# Patient Record
Sex: FEMALE | Race: WHITE | Employment: FULL TIME | URBAN - METROPOLITAN AREA
[De-identification: names, ages, dates, MRNs, and addresses within clinical notes are randomized per-mention and may not be internally consistent; named-entity substitution may affect disease eponyms.]

---

## 2024-03-31 SDOH — ECONOMIC STABILITY: HOUSING INSECURITY
IN THE LAST 12 MONTHS, WAS THERE A TIME WHEN YOU DID NOT HAVE A STEADY PLACE TO SLEEP OR SLEPT IN A SHELTER (INCLUDING NOW)?: NO

## 2024-03-31 SDOH — ECONOMIC STABILITY: FOOD INSECURITY: WITHIN THE PAST 12 MONTHS, YOU WORRIED THAT YOUR FOOD WOULD RUN OUT BEFORE YOU GOT MONEY TO BUY MORE.: NEVER TRUE

## 2024-03-31 SDOH — ECONOMIC STABILITY: INCOME INSECURITY: HOW HARD IS IT FOR YOU TO PAY FOR THE VERY BASICS LIKE FOOD, HOUSING, MEDICAL CARE, AND HEATING?: NOT VERY HARD

## 2024-03-31 SDOH — ECONOMIC STABILITY: FOOD INSECURITY: WITHIN THE PAST 12 MONTHS, THE FOOD YOU BOUGHT JUST DIDN'T LAST AND YOU DIDN'T HAVE MONEY TO GET MORE.: NEVER TRUE

## 2024-03-31 SDOH — ECONOMIC STABILITY: TRANSPORTATION INSECURITY
IN THE PAST 12 MONTHS, HAS LACK OF TRANSPORTATION KEPT YOU FROM MEETINGS, WORK, OR FROM GETTING THINGS NEEDED FOR DAILY LIVING?: NO

## 2024-04-03 ENCOUNTER — OFFICE VISIT (OUTPATIENT)
Dept: OBGYN CLINIC | Age: 34
End: 2024-04-03
Payer: COMMERCIAL

## 2024-04-03 VITALS
BODY MASS INDEX: 25.27 KG/M2 | DIASTOLIC BLOOD PRESSURE: 74 MMHG | HEIGHT: 67 IN | WEIGHT: 161 LBS | SYSTOLIC BLOOD PRESSURE: 112 MMHG

## 2024-04-03 DIAGNOSIS — Z11.51 SCREENING FOR HPV (HUMAN PAPILLOMAVIRUS): ICD-10-CM

## 2024-04-03 DIAGNOSIS — Z12.4 PAP SMEAR FOR CERVICAL CANCER SCREENING: ICD-10-CM

## 2024-04-03 DIAGNOSIS — E66.3 OVERWEIGHT: ICD-10-CM

## 2024-04-03 DIAGNOSIS — N97.9 INFERTILITY, FEMALE: ICD-10-CM

## 2024-04-03 DIAGNOSIS — Z01.419 WELL WOMAN EXAM WITH ROUTINE GYNECOLOGICAL EXAM: Primary | ICD-10-CM

## 2024-04-03 DIAGNOSIS — Z31.69 INFERTILITY COUNSELING: ICD-10-CM

## 2024-04-03 PROCEDURE — 99385 PREV VISIT NEW AGE 18-39: CPT | Performed by: OBSTETRICS & GYNECOLOGY

## 2024-04-03 PROCEDURE — 99213 OFFICE O/P EST LOW 20 MIN: CPT | Performed by: OBSTETRICS & GYNECOLOGY

## 2024-04-03 PROCEDURE — 99459 PELVIC EXAMINATION: CPT | Performed by: OBSTETRICS & GYNECOLOGY

## 2024-04-03 RX ORDER — PNV NO.95/FERROUS FUM/FOLIC AC 28MG-0.8MG
TABLET ORAL
COMMUNITY

## 2024-04-03 NOTE — PROGRESS NOTES
CC:  Annual GYN exam    HPI:  33 y.o.  No obstetric history on file. presents today for a routine gynecological examination.  Patient's last menstrual period was 2024..    Pt additionally wants to discuss c/o concern for infertility in addition to her wellness exam. Her and her  have been trying x 6 months.   He is 41 and had negative semen analysis with [facility]  + OPKs at home with regular cycles  No endometriosis or PCOS symptoms  She has not seen a PCP recently. No recent lab work  + Fatigue   Timing intercourse each month   Healthy with no medical issues. On no medications   Non-smoker    Contraception:  None. Currently trying to conceive. On a PNV      Menses:  Regular    Sexually active:yes  No changes in sexual partners --declines STD testing        Ob hx:  G0      GYN HISTORY:  As per HPI     Last Pap:  5 years ago, normal   Hx of Abnl Paps: remote hx    Hx STDs: No       OB History          0    Para   0    Term   0       0    AB   0    Living   0         SAB   0    IAB   0    Ectopic   0    Molar   0    Multiple   0    Live Births   0                  Past Medical History:   Diagnosis Date    Abnormal Pap smear of cervix          Past Surgical History:   Procedure Laterality Date    COLPOSCOPY           Outpatient Encounter Medications as of 4/3/2024   Medication Sig Dispense Refill    Prenatal Vit-Fe Fumarate-FA (PRENATAL VITAMIN) 27-0.8 MG TABS Take by mouth       No facility-administered encounter medications on file as of 4/3/2024.         No Known Allergies      Family History   Problem Relation Age of Onset    Breast Cancer Paternal Grandmother     Breast Cancer Maternal Grandmother     Diabetes Father     Congenital Anomalies Neg Hx     Ovarian Cancer Neg Hx          Social History     Socioeconomic History    Marital status:    Tobacco Use    Smoking status: Never    Smokeless tobacco: Never   Vaping Use    Vaping Use: Never used   Substance and Sexual Activity

## 2024-04-06 LAB
COLLECTION METHOD: NORMAL
CYTOLOGIST CVX/VAG CYTO: NORMAL
CYTOLOGY CVX/VAG DOC THIN PREP: NORMAL
DATE OF LMP: NORMAL
HPV APTIMA: NEGATIVE
HPV GENOTYPE REFLEX: NORMAL
Lab: NORMAL
OTHER PT INFO: NORMAL
PAP SOURCE: NORMAL
PATH REPORT.FINAL DX SPEC: NORMAL
PREV CYTO INFO: NORMAL
PREV TREATMENT RESULTS: NORMAL
PREV TREATMENT: NORMAL
STAT OF ADQ CVX/VAG CYTO-IMP: NORMAL

## 2024-04-12 DIAGNOSIS — E66.3 OVERWEIGHT: ICD-10-CM

## 2024-04-12 DIAGNOSIS — N97.9 INFERTILITY, FEMALE: ICD-10-CM

## 2024-04-12 LAB
CHOLEST SERPL-MCNC: 230 MG/DL
EST. AVERAGE GLUCOSE BLD GHB EST-MCNC: 103 MG/DL
ESTRADIOL SERPL-MCNC: 33.04 PG/ML
FSH SERPL-ACNC: 8.8 MIU/ML
HBA1C MFR BLD: 5.2 % (ref 4.8–5.6)
HDLC SERPL-MCNC: 70 MG/DL (ref 40–60)
HDLC SERPL: 3.3
LDLC SERPL CALC-MCNC: 150 MG/DL
LH SERPL-ACNC: 4.3 MIU/ML
PROGEST SERPL-MCNC: 0.34 NG/ML
TRIGL SERPL-MCNC: 50 MG/DL (ref 35–150)
TSH W FREE THYROID IF ABNORMAL: 2.23 UIU/ML (ref 0.36–3.74)
VLDLC SERPL CALC-MCNC: 10 MG/DL (ref 6–23)

## 2024-04-16 LAB — MIS SERPL-MCNC: 0.39 NG/ML

## 2024-04-22 DIAGNOSIS — Z31.69 INFERTILITY COUNSELING: Primary | ICD-10-CM
